# Patient Record
Sex: MALE | Race: BLACK OR AFRICAN AMERICAN
[De-identification: names, ages, dates, MRNs, and addresses within clinical notes are randomized per-mention and may not be internally consistent; named-entity substitution may affect disease eponyms.]

---

## 2018-12-18 ENCOUNTER — HOSPITAL ENCOUNTER (EMERGENCY)
Dept: HOSPITAL 74 - JER | Age: 23
LOS: 1 days | Discharge: HOME | End: 2018-12-19
Payer: SELF-PAY

## 2018-12-18 VITALS — HEART RATE: 67 BPM | DIASTOLIC BLOOD PRESSURE: 49 MMHG | SYSTOLIC BLOOD PRESSURE: 109 MMHG

## 2018-12-18 VITALS — BODY MASS INDEX: 17.4 KG/M2

## 2018-12-18 VITALS — TEMPERATURE: 97.2 F

## 2018-12-18 NOTE — PDOC
Rapid Medical Evaluation


Chief Complaint: Pain


Medical Evaluation: 





12/18/18 21:45


I have performed a brief in-person evaluation of this patient.


The patient presents with a chief complaint of:dizziness/ abd pain with gas . 

no fevers / no vomiting or diarhea. 


Pertinent physical exam findings: lungs clear , abd soft with no rebound 


I have ordered the following: nothing 


The patient will proceed to the ED for further evaluation.

## 2018-12-18 NOTE — PDOC
History of Present Illness





- General


Chief Complaint: Pain


Stated Complaint: Lightheaded


Time Seen by Provider: 12/18/18 23:09


History Source: Patient





- History of Present Illness


Initial Comments: 





12/18/18 23:50


23 year old male c/o epigastric pain since 1 pm after eating baked Ziti. 

oatient denies NVD, urinary symptoms. denies pain at this time reports feeling 

lightheaded which now has resolved





Past History





- Past Medical History


Allergies/Adverse Reactions: 


 Allergies











Allergy/AdvReac Type Severity Reaction Status Date / Time


 


No Known Allergies Allergy   Verified 12/18/18 21:48











Home Medications: 


Ambulatory Orders





Mag Hydrox/Al Hydrox/Simeth [Mylanta Suspension -] 30 ml PO Q6H PRN #1 bottle 12 /18/18 








COPD: No





- Suicide/Smoking/Psychosocial Hx


Smoking History: Current every day smoker


Have you smoked in the past 12 months: Yes


Number of Cigarettes Smoked Daily: 10


Information on smoking cessation initiated: No


Hx Alcohol Use: No


Drug/Substance Use Hx: Yes (MARIJUANA)





**Review of Systems





- Review of Systems


Able to Perform ROS?: Yes


Is the patient limited English proficient: No





*Physical Exam





- Vital Signs


 Last Vital Signs











Temp Pulse Resp BP Pulse Ox


 


 97.2 F L  70   16   144/54 L  100 


 


 12/18/18 21:46  12/18/18 21:46  12/18/18 21:46  12/18/18 21:46  12/18/18 21:46














- Physical Exam


General Appearance: Yes: Appropriately Dressed


HEENT: positive: Normal ENT Inspection


Respiratory/Chest: positive: Lungs Clear, Normal Breath Sounds


Cardiovascular: positive: Regular Rhythm, Regular Rate


Gastrointestinal/Abdominal: positive: Normal Bowel Sounds, Soft.  negative: 

Tender


Musculoskeletal: positive: Normal Inspection


Extremity: positive: Normal Capillary Refill, Normal Inspection, Normal Range 

of Motion


Integumentary: positive: Normal Color, Dry, Warm


Neurologic: positive: Fully Oriented, Alert, Normal Mood/Affect





Moderate Sedation





- Procedure Monitoring


Vital Signs: 


Procedure Monitoring Vital Signs











Temperature  97.2 F L  12/18/18 21:46


 


Pulse Rate  70   12/18/18 21:46


 


Respiratory Rate  16   12/18/18 21:46


 


Blood Pressure  144/54 L  12/18/18 21:46


 


O2 Sat by Pulse Oximetry (%)  100   12/18/18 21:46











Progress Note





- Progress Note


Progress Note: 





A: gastritis





P: maalox











*DC/Admit/Observation/Transfer


Diagnosis at time of Disposition: 


Gastritis


Qualifiers:


 Gastritis type: unspecified gastritis Chronicity: unspecified Gastritis 

bleeding: presence of bleeding unspecified Qualified Code(s): K29.70 - Gastritis

, unspecified, without bleeding








- Discharge Dispostion


Disposition: HOME





- Prescriptions


Prescriptions: 


Mag Hydrox/Al Hydrox/Simeth [Mylanta Suspension -] 30 ml PO Q6H PRN #1 bottle


 PRN Reason: Dyspepsia





- Referrals





- Patient Instructions


Printed Discharge Instructions:  [place] Diet


Additional Instructions: 


encourage plenty of fluids 





follow up with your doctor as soon as possible. 





- Post Discharge Activity


Forms/Work/School Notes:  Back to Work